# Patient Record
(demographics unavailable — no encounter records)

---

## 2025-06-23 NOTE — ASSESSMENT
[FreeTextEntry1] : Will try changing to colestipol 1gm two tablets daily. Will experiment with dose timing. Continue with alkaline water, GERD well controlled. Will refer to colorectal surgery for evaluation/treatment of hemorrhoids. F/U in 3-6 months.

## 2025-06-23 NOTE — HISTORY OF PRESENT ILLNESS
[FreeTextEntry1] : Mr. Gonzales is a 44M h/o generalized anxiety d/o, lower back pain, cholecystectomy 3/23 (chronic cholecystitis on pathology) who returns for f/u.  Was doing well after his lap-mindy, had been on prilosec and fiber supplements for indigestion, however he stopped his medications summer of 2023, was without symptoms, then symptoms recurred when starting a new job 9/23 when he had increased stress.  Symptoms were mainly severe fecal urgency, watery stool. Stool was loose, 2-3 times daily, often shortly after eating. Had stool studies 11/23 which were unremarkable. Improved with cholestyramine, alleviated symptoms, did not resolve them. Still with often 2-3 BMs daily especially when stressed. Does think there is a strong anxiety/stress component to his lower GI symptoms. Is on Lexapro now, unsure if it affected his symptoms. Off of his PPI, weened successfully.  GERD now controlled with alkaline water, avoiding eating too close to dinner. No significant heartburn recently. Weight now stable. Does see red blood on the toilet paper and sometimes in the toilet, on top of brown stool or staining the water, approximately once weekly. Father has been sick with liver cancer.  Underwent a CT A/P 6/24/24 showing splenomegaly, mildly enlarged prostate. Has since followed with heme/onc, no further workup for splenomegaly.  Colonoscopy 3/24: Int hemorrhoids only EGD in 2021 (outside institution), unremarkable as per his report Does not smoke or drink. No FHx of any GI malignancies.

## 2025-07-23 NOTE — PROCEDURE
[FreeTextEntry1] : X-rays were taken in the office multiple views right foot simulating angle and base of gait I have reviewed the x-rays taken in the office with the patient, I have discussed my findings my recommendations etiology and treatment options based on x-ray evaluation and interpretation and patient understands, There is no evidence of fracture dislocation although without the benefit of her weightbearing view no distinct evidence of hallux limitus as the image did not capture the great toe joint dorsally on an isolated image I have discussed the findings of the MRI pertaining to the presenting complaint. Treatment options also discussed with the patient. I also advised the patient that while an MRI is a good diagnostic tool is not a perfect picture and at times does not show all patholgy Based on my physical examination and my clinical findings and the patient's description of the symptoms, a complete differential diagnosis was reviewed with the patient. Possible diagnoses as well as treatment options explained in great detail. All questions asked and answered appropriately. There the classic signs and symptoms of hallux limitus with dorsal spurring noted at the great toe joint both on the base of the proximal phalanx and the first metatarsal head. These are classic. There is crepitus, jamming, pain and associated extensor longus tendon tendinitis. A gait exam was performed and there appears to be program and supination approaching mid stance as the patient admits to referred pain to the lateral aspect of the foot due to an inability to dorsiflex the great toe joint.  I had a lengthy discussion with the patient all questions asked and answered appropriately regarding a diagnosis of tendinitis/tenosynovitis. I did explain to the patient etiology of this diagnosis as well as treatment options. It is an inflammation of the tendon(s) in the foot and or ankle. In many cases both pressure as well as excessive motion can cause inflammation of the tendons and is treated with a combination of immobilization, anti-inflammatories, physical therapy, or combination of all of the above. Patient was given at home exercises to do and I did explain to them that there is a need for strict compliance to my chosen treatment options. All questions asked and answered appropriately to the patient's satisfaction. A complete and thorough evaluation of the type of shoes they should be wearing and type of shoes for this time of year was discussed with patient. The patient has classic and contributing signs of degenerative joint disease including but not limited to joint space narrowing chronic deformity pain after rest, morning stiffness distal fusiform swelling and warmth Since the patient is currently taking non-steroidal anti-inflammatory medication I had a complete and thorough discussion with the patient regarding risks and benefits of these types of medications. The most common side effects include but are not limited to gastrointestinal upset, blood in the stool, nausea, diarrhea, as well as tinnitus. I have advised the patient that if they should experience any of the side effects that she discontinue them at once and contacted primary care physician for immediate followup. I did advise the patient that these types of medications can be taken on an as needed basis and if they are not having pain he should not take them. I also advised that the patient should follow the explicit instructions on the labile and not to exceed the recommended dosage.  The patient was advised formal physical therapy is critical at this point and that I recommend it in order to try and achieve best possible outcome to their problem. Rx has been supplied.  A complete and thorough evaluation of the type of shoes they should be wearing and type of shoes for this time of year was discussed with patient.

## 2025-07-23 NOTE — REVIEW OF SYSTEMS
[As Noted in HPI] : as noted in HPI [Joint Stiffness] : joint stiffness [Joint Swelling] : no joint swelling [Negative] : Heme/Lymph

## 2025-07-23 NOTE — PHYSICAL EXAM
[General Appearance - Alert] : alert [General Appearance - In No Acute Distress] : in no acute distress [FreeTextEntry3] : Vascular exam reveals palpable pedal pulses, the foot is warm to touch, there was good capillary fill time, the skin is normal in appearance there is no evidence of vascular disease or compromise at this time  [de-identified] : There is reproducible pain upon palpation of the dorsal aspect of the great toe joint on the right foot and to a lesser degree medial and plantar.  He does state that there is some pain distal along the proximal half of the proximal phalanx along the course of the EHL tendon Signs and symptoms are consistent with an arthritic great toe joint and on MRI there is evidence of a small hallux limitus deformity [Skin Color & Pigmentation] : normal skin color and pigmentation [Skin Turgor] : normal skin turgor [] : no rash [Skin Lesions] : no skin lesions [Foot Ulcer] : no foot ulcer [Skin Induration] : no skin induration [FreeTextEntry1] :  Gross sensorium is intact, patient denies numbness tingling and sharp shooting pains  [Oriented To Time, Place, And Person] : oriented to person, place, and time

## 2025-07-28 NOTE — PHYSICAL EXAM
[FreeTextEntry1] : Anorectal physical exam with visual, JASPREET, anoscopic exam reveals a right posterior internal/external hemorrhoid complex engorgement.  Digital rectal exam is nontender without palpable mass normal sphincter tone Anoscopic exam reveals chronic engorgement of the internal hemorrhoids, right posterior greater than left lateral, greater than right anterior I discussed with the patient the risks and benefits limitations of office management and similar with surgical management.   At this time Km is wishing to see what potential improvement could be gained with an office management program, we both agree that surgical management decisions be deferred to a later date.

## 2025-07-28 NOTE — PROCEDURE
[FreeTextEntry1] : After discussion of the potential benefits, verbal and written consent is obtained from the patient.    Following a Time Out procedure under [facility] guidelines, with MA or NP participation, with the patient positioned in the left lateral decubitus position, anoscope insertion exposes each individual internal hemorrhoid quadrant.   Sodium Tetradecol 1%, 2 cc total volume distributed over 3 internal hemorrhoid quadrants using a 3 cc syringe 27-gauge needle. No complication of the procedure performance.   Written information provided to the patient on both the procedure and postprocedure care recommendations.

## 2025-07-28 NOTE — CONSULT LETTER
[Consult Letter:] : I had the pleasure of evaluating your patient, [unfilled]. [Please see my note below.] : Please see my note below. [Consult Closing:] : Thank you very much for allowing me to participate in the care of this patient.  If you have any questions, please do not hesitate to contact me. [Sincerely,] : Sincerely, [FreeTextEntry2] : MD Yoel Gr MD [FreeTextEntry3] : Alexx Barrera MD FASCRS

## 2025-07-28 NOTE — ASSESSMENT
[FreeTextEntry1] : 44-year-old gentleman with chronic symptomatic hemorrhoid issues, with physical exam findings suggesting the possibility of office management and also the potential benefits of definitive surgical hemorrhoidectomy.  Km will follow-up with me over the next 4 to 6 weeks with continued office management if he experiences a significant symptomatic improvement following today's internal hemorrhoid sclerosis office procedure.  Meanwhile written information on postoperative hemorrhoidectomy recovery provided to patient for his consideration.

## 2025-07-28 NOTE — HISTORY OF PRESENT ILLNESS
[FreeTextEntry1] : Mr. Gonzales is a 44 year old male who is here for the evaluation and management of his symptomatic hemorrhoids. Referred by Dr. Potter. PMH of generalized anxiety disorder, lower back pain, cholecystectomy, IBS-D  No family history of colon cancer  Colonoscopy 3/20/2024 Impressions: -Internal hemorrhoids. -The colon was otherwise unremarkable. Recall 10 years

## 2025-07-28 NOTE — PROCEDURE
[FreeTextEntry1] : After discussion of the potential benefits, verbal and written consent is obtained from the patient.    Following a Time Out procedure under [facility] guidelines, with MA or NP participation, with the patient positioned in the left lateral decubitus position, anoscope insertion exposes each individual internal hemorrhoid quadrant.   Sodium Tetradecol 1%, 2 cc total volume distributed over 3 internal hemorrhoid quadrants using a 3 cc syringe 27-gauge needle. No complication of the procedure performance.   Written information provided to the patient on both the procedure and postprocedure care recommendations.   General